# Patient Record
Sex: MALE | Race: BLACK OR AFRICAN AMERICAN | NOT HISPANIC OR LATINO | Employment: UNEMPLOYED | URBAN - METROPOLITAN AREA
[De-identification: names, ages, dates, MRNs, and addresses within clinical notes are randomized per-mention and may not be internally consistent; named-entity substitution may affect disease eponyms.]

---

## 2017-10-10 PROBLEM — D64.9 ANEMIA: Status: ACTIVE | Noted: 2017-10-10

## 2017-10-10 PROBLEM — D72.829 LEUCOCYTOSIS: Status: ACTIVE | Noted: 2017-10-10

## 2017-10-10 PROBLEM — F29 PSYCHOSIS: Status: ACTIVE | Noted: 2017-10-10

## 2017-10-14 PROBLEM — D72.829 LEUCOCYTOSIS: Status: RESOLVED | Noted: 2017-10-10 | Resolved: 2017-10-14

## 2017-10-17 PROBLEM — D72.829 LEUCOCYTOSIS: Status: RESOLVED | Noted: 2017-10-17 | Resolved: 2017-10-14

## 2020-04-27 ENCOUNTER — TELEPHONE (OUTPATIENT)
Dept: INTERNAL MEDICINE | Facility: CLINIC | Age: 45
End: 2020-04-27

## 2020-04-27 NOTE — TELEPHONE ENCOUNTER
----- Message from Mariajose Miller sent at 4/27/2020  3:19 PM CDT -----  Contact: pt  Pt will be coming tomorrow as a new pt, he has a couple of medication he needs refilled. Please request those medical records from [facility] at [address], fax number at [phone]. If any questions please give a call back at [phone].          Thanks,  Mariajose ARRIETA

## 2020-04-27 NOTE — TELEPHONE ENCOUNTER
Will get patient to sign CHARY when he checks in tomorrow with his appointment with Dr. Peguero.//KM

## 2020-04-28 ENCOUNTER — TELEPHONE (OUTPATIENT)
Dept: PAIN MEDICINE | Facility: CLINIC | Age: 45
End: 2020-04-28
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
